# Patient Record
Sex: MALE | Race: WHITE | Employment: FULL TIME | URBAN - METROPOLITAN AREA
[De-identification: names, ages, dates, MRNs, and addresses within clinical notes are randomized per-mention and may not be internally consistent; named-entity substitution may affect disease eponyms.]

---

## 2022-08-18 ENCOUNTER — APPOINTMENT (OUTPATIENT)
Dept: GENERAL RADIOLOGY | Age: 42
End: 2022-08-18
Payer: COMMERCIAL

## 2022-08-18 ENCOUNTER — HOSPITAL ENCOUNTER (EMERGENCY)
Age: 42
Discharge: HOME | End: 2022-08-18
Attending: EMERGENCY MEDICINE
Payer: COMMERCIAL

## 2022-08-18 VITALS
HEART RATE: 86 BPM | HEIGHT: 77 IN | WEIGHT: 256.84 LBS | TEMPERATURE: 99 F | OXYGEN SATURATION: 97 % | DIASTOLIC BLOOD PRESSURE: 92 MMHG | SYSTOLIC BLOOD PRESSURE: 154 MMHG | RESPIRATION RATE: 16 BRPM | BODY MASS INDEX: 30.33 KG/M2

## 2022-08-18 DIAGNOSIS — S50.12XA CONTUSION OF LEFT FOREARM, INITIAL ENCOUNTER: Primary | ICD-10-CM

## 2022-08-18 PROCEDURE — 73090 X-RAY EXAM OF FOREARM: CPT

## 2022-08-18 PROCEDURE — 6370000000 HC RX 637 (ALT 250 FOR IP): Performed by: EMERGENCY MEDICINE

## 2022-08-18 PROCEDURE — 99283 EMERGENCY DEPT VISIT LOW MDM: CPT

## 2022-08-18 RX ORDER — IBUPROFEN 600 MG/1
600 TABLET ORAL ONCE
Status: COMPLETED | OUTPATIENT
Start: 2022-08-18 | End: 2022-08-18

## 2022-08-18 RX ADMIN — IBUPROFEN 600 MG: 600 TABLET ORAL at 13:36

## 2022-08-18 ASSESSMENT — PAIN - FUNCTIONAL ASSESSMENT: PAIN_FUNCTIONAL_ASSESSMENT: 0-10

## 2022-08-18 ASSESSMENT — PAIN SCALES - GENERAL
PAINLEVEL_OUTOF10: 4
PAINLEVEL_OUTOF10: 3
PAINLEVEL_OUTOF10: 3

## 2022-08-18 ASSESSMENT — PAIN DESCRIPTION - DESCRIPTORS: DESCRIPTORS: ACHING

## 2022-08-18 ASSESSMENT — PAIN DESCRIPTION - LOCATION: LOCATION: ARM

## 2022-08-18 ASSESSMENT — PAIN DESCRIPTION - ORIENTATION: ORIENTATION: LEFT

## 2022-08-18 NOTE — ED PROVIDER NOTES
[address] ENCOUNTER      Pt Name: Gildardo Lara  MRN: [id number]  [place] 1980  Date of evaluation: 8/18/2022  Provider: Marianna Cruz MD    CHIEF COMPLAINT       Chief Complaint   Patient presents with    Arm Injury     Pt. Had a couch fall on left forearm at work at 1230, swelling noted left forearm, ice applied         HISTORY OF PRESENT ILLNESS   (Location/Symptom, Timing/Onset, Context/Setting, Quality, Duration, Modifying Factors, Severity)  Note limiting factors. Gildardo Lara is a 40 y.o. male with past medical history of no significant illness here today with a left forearm injury. Patient states he was at work and just prior to arrival a couch fell and landed directly on his forearm. He is complaining with throbbing aching pain in the left forearm worse with any attempted range of motion or touching the site. He states he has noticed a large bruise at the area. He does not take blood thinners. No numbness, tingling or weakness in his hand. HPI    Nursing Notes were reviewed. REVIEW OF SYSTEMS    (2-9 systems for level 4, 10 or more for level 5)     Review of Systems    Please see HPI for pertinent positive and negative review of system findings. A full 10 system ROS was performed and otherwise negative. PAST MEDICAL HISTORY   History reviewed. No pertinent past medical history. SURGICAL HISTORY       Past Surgical History:   Procedure Laterality Date    HERNIA REPAIR Left 05/01/2010    TYMPANOSTOMY TUBE PLACEMENT Bilateral          CURRENT MEDICATIONS       Previous Medications    No medications on file       ALLERGIES     Codeine and Motrin [ibuprofen micronized]    FAMILY HISTORY     History reviewed. No pertinent family history.        SOCIAL HISTORY       Social History     Socioeconomic History    Marital status: Single     Spouse name: None    Number of children: None    Years of education: None    Highest education level: None   Tobacco Use    Smoking status: Every Day     Packs/day: 0.50     Types: Cigarettes    Smokeless tobacco: Never   Vaping Use    Vaping Use: Never used   Substance and Sexual Activity    Alcohol use: Yes     Comment: weekends    Drug use: Never       SCREENINGS    Sagrario Coma Scale  Eye Opening: Spontaneous  Best Verbal Response: Oriented  Best Motor Response: Obeys commands  Sagrario Coma Scale Score: 15          PHYSICAL EXAM    (up to 7 for level 4, 8 or more for level 5)     ED Triage Vitals   BP Temp Temp src Pulse Resp SpO2 Height Weight   -- -- -- -- -- -- -- --     Vitals:    08/18/22 1323   BP: (!) 158/118   Pulse: 98   Resp: 16   Temp: 99 °F (37.2 °C)   SpO2: 97%         Physical Exam      General appearance:  Cooperative. No acute distress. Skin:  Warm. Dry. Ears, nose, mouth and throat:  Oral mucosa moist,  Perfusion:  intact with 2+ left radial pulse  Respiratory: Respirations nonlabored. Neurological:  Alert. Moves all extremities spontaneously. Intact sensation throughout all digits of the left hand  Musculoskeletal:   Palpable hematoma over the distal third of the radius with no gross defect. Tenderness to palpation at this site. Overall compartments of the forearm are soft. Patient can flex and extend the left wrist.  Normal flexion extension of all fingers in the left hand. Psychiatric:  Normal mood      DIAGNOSTIC RESULTS       Labs Reviewed - No data to display    Interpretation per the Radiologist below, if obtained/available at the time of this note:    XR RADIUS ULNA LEFT (2 VIEWS)   Final Result   Soft tissue swelling without osseous abnormality             All other labs/imaging were within normal range or not returned as of this dictation.     EMERGENCY DEPARTMENT COURSE and DIFFERENTIAL DIAGNOSIS/MDM:   Vitals:    Vitals:    08/18/22 1323   BP: (!) 158/118   Pulse: 98   Resp: 16   Temp: 99 °F (37.2 °C)   TempSrc: Oral   SpO2: 97%   Weight: 256 lb 13.4 oz (116.5 kg)   Height: 6' 5\" (1.956 m)       Patient presented to the emergency department today after having a couch laying on his left forearm. Exam consistent with small hematoma and contusion but no gross deformity. X-rays show no evidence of fracture. Neurovascularly intact distally. Placed in an Ace wrap. Recommend RICE. May follow-up as an outpatient with occupational health as needed    Tana Sevilla M.D., nathan the primary clinician of record. MDM      CONSULTS     None    Critical Care:   None    REASSESSMENT          PROCEDURE     Unless otherwise noted below, none     Procedures      FINAL IMPRESSION      1. Contusion of left forearm, initial encounter            DISPOSITION/PLAN   DISPOSITION Decision To Discharge 08/18/2022 02:09:11 PM        PATIENT REFERRED TO:  [address] [facility] [address]  [phone]  Schedule an appointment as soon as possible for a visit       DISCHARGE MEDICATIONS:  New Prescriptions    No medications on file     Controlled Substances Monitoring:     No flowsheet data found.     (Please note that portions of this note were completed with a voice recognition program.  Efforts were made to edit the dictations but occasionally words are mis-transcribed.)    Derrick Aguero MD (electronically signed)  Attending Emergency Physician            Gray Osorio MD  08/18/22 7556

## 2022-08-18 NOTE — Clinical Note
Tayler Marquez was seen and treated in our emergency department on 8/18/2022. He may return to work on 08/19/2022. If you have any questions or concerns, please don't hesitate to call.       Margie Sebastian MD

## 2023-06-28 ENCOUNTER — HOSPITAL ENCOUNTER (EMERGENCY)
Age: 43
Discharge: HOME | End: 2023-06-28
Attending: EMERGENCY MEDICINE
Payer: MEDICAID

## 2023-06-28 ENCOUNTER — APPOINTMENT (OUTPATIENT)
Dept: CT IMAGING | Age: 43
End: 2023-06-28
Payer: MEDICAID

## 2023-06-28 VITALS
SYSTOLIC BLOOD PRESSURE: 144 MMHG | BODY MASS INDEX: 31.63 KG/M2 | RESPIRATION RATE: 16 BRPM | TEMPERATURE: 98 F | OXYGEN SATURATION: 95 % | DIASTOLIC BLOOD PRESSURE: 99 MMHG | WEIGHT: 267.86 LBS | HEART RATE: 79 BPM | HEIGHT: 77 IN

## 2023-06-28 DIAGNOSIS — S00.83XA CONTUSION OF JAW, INITIAL ENCOUNTER: Primary | ICD-10-CM

## 2023-06-28 PROCEDURE — 99284 EMERGENCY DEPT VISIT MOD MDM: CPT

## 2023-06-28 PROCEDURE — 70486 CT MAXILLOFACIAL W/O DYE: CPT

## 2023-06-28 ASSESSMENT — PAIN SCALES - GENERAL
PAINLEVEL_OUTOF10: 4
PAINLEVEL_OUTOF10: 2
PAINLEVEL_OUTOF10: 0

## 2023-06-28 ASSESSMENT — PAIN DESCRIPTION - PAIN TYPE: TYPE: ACUTE PAIN

## 2023-06-28 ASSESSMENT — PAIN - FUNCTIONAL ASSESSMENT
PAIN_FUNCTIONAL_ASSESSMENT: 0-10
PAIN_FUNCTIONAL_ASSESSMENT: 0-10

## 2023-06-28 ASSESSMENT — PAIN DESCRIPTION - DESCRIPTORS: DESCRIPTORS: ACHING

## 2023-06-28 ASSESSMENT — PAIN DESCRIPTION - ORIENTATION: ORIENTATION: RIGHT

## 2023-06-28 ASSESSMENT — PAIN DESCRIPTION - FREQUENCY: FREQUENCY: CONTINUOUS

## 2023-06-28 ASSESSMENT — PAIN DESCRIPTION - LOCATION: LOCATION: JAW

## 2024-12-23 ENCOUNTER — APPOINTMENT (OUTPATIENT)
Dept: GENERAL RADIOLOGY | Age: 44
End: 2024-12-23
Payer: OTHER GOVERNMENT

## 2024-12-23 ENCOUNTER — APPOINTMENT (OUTPATIENT)
Dept: CT IMAGING | Age: 44
End: 2024-12-23
Payer: OTHER GOVERNMENT

## 2024-12-23 ENCOUNTER — HOSPITAL ENCOUNTER (EMERGENCY)
Age: 44
Discharge: HOME | End: 2024-12-23
Attending: EMERGENCY MEDICINE
Payer: OTHER GOVERNMENT

## 2024-12-23 VITALS
OXYGEN SATURATION: 96 % | WEIGHT: 262.13 LBS | DIASTOLIC BLOOD PRESSURE: 98 MMHG | HEIGHT: 76 IN | BODY MASS INDEX: 31.92 KG/M2 | RESPIRATION RATE: 17 BRPM | TEMPERATURE: 98.2 F | SYSTOLIC BLOOD PRESSURE: 148 MMHG | HEART RATE: 93 BPM

## 2024-12-23 DIAGNOSIS — E83.42 HYPOMAGNESEMIA: Primary | ICD-10-CM

## 2024-12-23 DIAGNOSIS — K70.9 ALCOHOLIC LIVER DISEASE (HCC): ICD-10-CM

## 2024-12-23 DIAGNOSIS — F10.20 ALCOHOLISM (HCC): ICD-10-CM

## 2024-12-23 DIAGNOSIS — S09.90XA INJURY OF HEAD, INITIAL ENCOUNTER: ICD-10-CM

## 2024-12-23 DIAGNOSIS — S16.1XXA ACUTE STRAIN OF NECK MUSCLE, INITIAL ENCOUNTER: ICD-10-CM

## 2024-12-23 LAB
ALBUMIN SERPL-MCNC: 3.6 G/DL (ref 3.4–5)
ALBUMIN/GLOB SERPL: 0.8 {RATIO} (ref 1.1–2.2)
ALP SERPL-CCNC: 161 U/L (ref 40–129)
ALT SERPL-CCNC: 151 U/L (ref 10–40)
ANION GAP SERPL CALCULATED.3IONS-SCNC: 12 MMOL/L (ref 3–16)
AST SERPL-CCNC: 185 U/L (ref 15–37)
BASOPHILS # BLD: 0 K/UL (ref 0–0.2)
BASOPHILS NFR BLD: 0.5 %
BILIRUB SERPL-MCNC: 2.6 MG/DL (ref 0–1)
BUN SERPL-MCNC: 8 MG/DL (ref 7–20)
CALCIUM SERPL-MCNC: 9.2 MG/DL (ref 8.3–10.6)
CHLORIDE SERPL-SCNC: 104 MMOL/L (ref 99–110)
CO2 SERPL-SCNC: 24 MMOL/L (ref 21–32)
CREAT SERPL-MCNC: 0.8 MG/DL (ref 0.9–1.3)
DEPRECATED RDW RBC AUTO: 11.9 % (ref 12.4–15.4)
EOSINOPHIL # BLD: 0.1 K/UL (ref 0–0.6)
EOSINOPHIL NFR BLD: 1.5 %
GFR SERPLBLD CREATININE-BSD FMLA CKD-EPI: >90 ML/MIN/{1.73_M2}
GLUCOSE SERPL-MCNC: 144 MG/DL (ref 70–99)
HCT VFR BLD AUTO: 49.8 % (ref 40.5–52.5)
HGB BLD-MCNC: 16.8 G/DL (ref 13.5–17.5)
IMM GRANULOCYTES # BLD: 0 K/UL (ref 0–0.2)
IMM GRANULOCYTES NFR BLD: 0.2 %
LIPASE SERPL-CCNC: 53 U/L (ref 13–60)
LYMPHOCYTES # BLD: 1.3 K/UL (ref 1–5.1)
LYMPHOCYTES NFR BLD: 32 %
MAGNESIUM SERPL-MCNC: 1.1 MG/DL (ref 1.8–2.4)
MCH RBC QN AUTO: 31.8 PG (ref 26–34)
MCHC RBC AUTO-ENTMCNC: 33.7 G/DL (ref 32–36.4)
MCV RBC AUTO: 94.3 FL (ref 80–100)
MONOCYTES # BLD: 0.3 K/UL (ref 0–1.3)
MONOCYTES NFR BLD: 7.4 %
NEUTROPHILS # BLD: 2.4 K/UL (ref 1.7–7.7)
NEUTROPHILS NFR BLD: 58.4 %
PLATELET # BLD AUTO: 49 K/UL (ref 135–450)
PLATELET BLD QL SMEAR: ABNORMAL
PMV BLD AUTO: 13.5 FL (ref 5–10.5)
POTASSIUM SERPL-SCNC: 3.5 MMOL/L (ref 3.5–5.1)
PROT SERPL-MCNC: 8.1 G/DL (ref 6.4–8.2)
RBC # BLD AUTO: 5.28 M/UL (ref 4.2–5.9)
SLIDE REVIEW: ABNORMAL
SODIUM SERPL-SCNC: 140 MMOL/L (ref 136–145)
TROPONIN, HIGH SENSITIVITY: 7 NG/L (ref 0–22)
WBC # BLD AUTO: 4 K/UL (ref 4–11)

## 2024-12-23 PROCEDURE — 36415 COLL VENOUS BLD VENIPUNCTURE: CPT

## 2024-12-23 PROCEDURE — 70450 CT HEAD/BRAIN W/O DYE: CPT

## 2024-12-23 PROCEDURE — 6360000002 HC RX W HCPCS: Performed by: EMERGENCY MEDICINE

## 2024-12-23 PROCEDURE — 96376 TX/PRO/DX INJ SAME DRUG ADON: CPT

## 2024-12-23 PROCEDURE — 96365 THER/PROPH/DIAG IV INF INIT: CPT

## 2024-12-23 PROCEDURE — 71045 X-RAY EXAM CHEST 1 VIEW: CPT

## 2024-12-23 PROCEDURE — 99285 EMERGENCY DEPT VISIT HI MDM: CPT

## 2024-12-23 PROCEDURE — 84484 ASSAY OF TROPONIN QUANT: CPT

## 2024-12-23 PROCEDURE — 83735 ASSAY OF MAGNESIUM: CPT

## 2024-12-23 PROCEDURE — 83690 ASSAY OF LIPASE: CPT

## 2024-12-23 PROCEDURE — 2580000003 HC RX 258: Performed by: EMERGENCY MEDICINE

## 2024-12-23 PROCEDURE — 80053 COMPREHEN METABOLIC PANEL: CPT

## 2024-12-23 PROCEDURE — 85025 COMPLETE CBC W/AUTO DIFF WBC: CPT

## 2024-12-23 PROCEDURE — 96375 TX/PRO/DX INJ NEW DRUG ADDON: CPT

## 2024-12-23 PROCEDURE — 93005 ELECTROCARDIOGRAM TRACING: CPT | Performed by: EMERGENCY MEDICINE

## 2024-12-23 PROCEDURE — 72125 CT NECK SPINE W/O DYE: CPT

## 2024-12-23 PROCEDURE — 96366 THER/PROPH/DIAG IV INF ADDON: CPT

## 2024-12-23 RX ORDER — ONDANSETRON 4 MG/1
4 TABLET, ORALLY DISINTEGRATING ORAL 3 TIMES DAILY PRN
Qty: 21 TABLET | Refills: 0 | Status: SHIPPED | OUTPATIENT
Start: 2024-12-23

## 2024-12-23 RX ORDER — LORAZEPAM 2 MG/ML
1 INJECTION INTRAMUSCULAR ONCE
Status: COMPLETED | OUTPATIENT
Start: 2024-12-23 | End: 2024-12-23

## 2024-12-23 RX ORDER — ONDANSETRON 4 MG/1
4 TABLET, ORALLY DISINTEGRATING ORAL 3 TIMES DAILY PRN
Qty: 21 TABLET | Refills: 0 | Status: SHIPPED | OUTPATIENT
Start: 2024-12-23 | End: 2024-12-23

## 2024-12-23 RX ORDER — 0.9 % SODIUM CHLORIDE 0.9 %
1000 INTRAVENOUS SOLUTION INTRAVENOUS ONCE
Status: COMPLETED | OUTPATIENT
Start: 2024-12-23 | End: 2024-12-23

## 2024-12-23 RX ORDER — ONDANSETRON 2 MG/ML
4 INJECTION INTRAMUSCULAR; INTRAVENOUS ONCE
Status: COMPLETED | OUTPATIENT
Start: 2024-12-23 | End: 2024-12-23

## 2024-12-23 RX ORDER — MAGNESIUM SULFATE IN WATER 40 MG/ML
2000 INJECTION, SOLUTION INTRAVENOUS ONCE
Status: COMPLETED | OUTPATIENT
Start: 2024-12-23 | End: 2024-12-23

## 2024-12-23 RX ADMIN — SODIUM CHLORIDE 1000 ML: 0.9 INJECTION, SOLUTION INTRAVENOUS at 18:23

## 2024-12-23 RX ADMIN — LORAZEPAM 1 MG: 2 INJECTION INTRAMUSCULAR; INTRAVENOUS at 19:51

## 2024-12-23 RX ADMIN — ONDANSETRON 4 MG: 2 INJECTION INTRAMUSCULAR; INTRAVENOUS at 19:49

## 2024-12-23 RX ADMIN — MAGNESIUM SULFATE HEPTAHYDRATE 2000 MG: 2 INJECTION, SOLUTION INTRAVENOUS at 18:52

## 2024-12-23 RX ADMIN — LORAZEPAM 1 MG: 2 INJECTION INTRAMUSCULAR; INTRAVENOUS at 17:59

## 2024-12-23 RX ADMIN — ONDANSETRON 4 MG: 2 INJECTION INTRAMUSCULAR; INTRAVENOUS at 18:00

## 2024-12-23 ASSESSMENT — PAIN DESCRIPTION - DESCRIPTORS
DESCRIPTORS: ACHING;DISCOMFORT
DESCRIPTORS: ACHING

## 2024-12-23 ASSESSMENT — PAIN DESCRIPTION - LOCATION
LOCATION: HEAD
LOCATION: HEAD;NECK

## 2024-12-23 ASSESSMENT — PAIN - FUNCTIONAL ASSESSMENT
PAIN_FUNCTIONAL_ASSESSMENT: 0-10
PAIN_FUNCTIONAL_ASSESSMENT: PREVENTS OR INTERFERES SOME ACTIVE ACTIVITIES AND ADLS

## 2024-12-23 ASSESSMENT — LIFESTYLE VARIABLES
HOW MANY STANDARD DRINKS CONTAINING ALCOHOL DO YOU HAVE ON A TYPICAL DAY: 3 OR 4
HOW OFTEN DO YOU HAVE A DRINK CONTAINING ALCOHOL: 2-3 TIMES A WEEK

## 2024-12-23 ASSESSMENT — PAIN DESCRIPTION - FREQUENCY: FREQUENCY: CONTINUOUS

## 2024-12-23 ASSESSMENT — PAIN DESCRIPTION - PAIN TYPE: TYPE: ACUTE PAIN

## 2024-12-23 ASSESSMENT — PAIN DESCRIPTION - ORIENTATION: ORIENTATION: ANTERIOR;MID;POSTERIOR

## 2024-12-23 ASSESSMENT — PAIN SCALES - GENERAL
PAINLEVEL_OUTOF10: 3
PAINLEVEL_OUTOF10: 6
PAINLEVEL_OUTOF10: 3

## 2024-12-23 NOTE — ED NOTES
Pt requesting some more nausea medication and something for a sharp neck pain that he is now having in the base of his neck. EDMD made aware and states he will order something. RN awaiting orders

## 2024-12-23 NOTE — ED PROVIDER NOTES
EMERGENCY DEPARTMENT ENCOUNTER     [facility]     Pt Name: Amor Valadez   MRN: [id number]   Birthdate 1980   Date of evaluation: 12/23/2024   Provider: WALESKA COOK MD   PCP: No primary care provider on file.   Note Started: 5:57 PM EST 12/23/24     CHIEF COMPLAINT     Chief Complaint   Patient presents with    Head Injury     Occurred while walking to the restroom this morning around 6881-3633, reporting he slipped while walking and landed on the left side of his head. Reports only laid there for a minute or two;  reports around 8905-4207 started having N/V w/ a severe headache; also reports having random seizure HX (last one 2022) w/o medications. Also had a few alcohol beverages yesterday afternoon         HISTORY OF PRESENT ILLNESS:  History from : Patient   Limitations to history : None     Amor Valadez is a 44 y.o. male who presents with headache and vomiting.  The patient states that about 2:58 in the morning he was walking to use the restroom slipped and fell hitting the left side of his head.  He thinks he briefly lost consciousness after hitting his head, but was able to get himself back to bed.  He has had a headache ever since then and started vomiting around 1 PM today having vomited several times.  The patient had had about 6 double bourbons and Coke that evening.  He describes himself as an alcoholic and that is not a lot of alcohol at all and his wife said he was not intoxicated last night.  He has had a seizure back in 2022 associated with heavy alcohol use.  The patient has no abdominal pain or back pain.  He does have neck pain.  He has no numbness or tingling or weakness in the arms of the legs.    Nursing Notes were all reviewed and agreed with or any disagreements were addressed in the HPI.     ROS: Positives and Pertinent negatives as per HPI.    PAST MEDICAL HISTORY     Past medical history:  has no past medical history on

## 2024-12-23 NOTE — ED TRIAGE NOTES
Patient arrived to the ED ambulatory from home w/ complaints of head injury.     Patient/EMS reports states Occurred while walking to the restroom this morning around 7919-0747, reporting he slipped while walking and landed on the left side of his head. Reports only laid there for a minute or two;  reports around 8655-1013 started having N/V w/ a severe headache; also reports having random seizure HX (last one 2022) w/o medications. Also had a few alcohol beverages yesterday afternoon.    Pt arrives to the ED diaphoretic and dry heaving; reporting last drink was around 4196-7955 and he is normally a heavy drinker and wife reports that his drinking wasn't nearly as much as he normally does.      Patient A&O x 4,

## 2024-12-24 LAB
EKG ATRIAL RATE: 118 BPM
EKG DIAGNOSIS: NORMAL
EKG P AXIS: 62 DEGREES
EKG P-R INTERVAL: 142 MS
EKG Q-T INTERVAL: 328 MS
EKG QRS DURATION: 80 MS
EKG QTC CALCULATION (BAZETT): 459 MS
EKG R AXIS: 27 DEGREES
EKG T AXIS: 35 DEGREES
EKG VENTRICULAR RATE: 118 BPM

## 2024-12-24 PROCEDURE — 93010 ELECTROCARDIOGRAM REPORT: CPT | Performed by: INTERNAL MEDICINE

## 2024-12-24 NOTE — ED NOTES
Patient c/o nausea, reports pain base of neck, also HA middle of forehead, rates as 6/10.  CIWA scale complete.  See flowsheet.      When asked, patient reports he normally drinks 600 mL of bourbon daily.  He states yesterday he only drank 375 mL, last drink was 12/22/24 @ 1800.    Dr. Dudley updated.

## 2024-12-24 NOTE — ED NOTES
Patient requesting water while waiting for IV mag to finish.  Water provided.  Patient denies further needs.  Will continue to monitor.

## 2024-12-24 NOTE — ED NOTES
Discharge instructions reviewed with patient and wife.  Patient's wife requests pharmacy to be changed to [facility], asks if patient can wear a scopolamine patch when they get home and patient requests work note.  Dr. Dudley updated.  Work note provided, instructed patient to not use scopolamine patch, pharmacy updated.  Patient and wife verbalized understanding.  Patient's IV is removed, catheter intact, pressure dressing applied, no noted bleeding from site.  Patient ambulates from ED, gait is steady, all belongings in hand, in no apparent distress at this time.

## 2024-12-24 NOTE — ED PROVIDER NOTES
[place] of Care Note:    I assumed care of this patient at 1900 from Dr. Yadav, please see Leif note for more detail. Briefly, this pt is a 44-year-old alcoholic who reportedly fell hitting the back of his head last night and has had headache, neck pain and vomiting today.  Patient reports no diarrhea or abdominal pain.  No pale stools.  Patient is a heavy daily drinker.  No neurologic deficits, visual changes, epistaxis.  No chest pain or shortness of breath.    Laboratory findings: Patient does have hypomagnesemia with a magnesium level of 1.1 as well as elevated alk phos, total bilirubin and transaminases but reports no abdominal pain or diarrhea.    Medical decision making: Head injury with vomiting and nausea most likely represent patient with mild concussion.  Patient did report some neck discomfort but CAT scan of the head reveals no evidence of skull fracture or intracranial injury, intracranial bleeding and cervical spine films revealed no evidence of acute fracture or dislocation.  I am hesitant to use anti-inflammatories or Tylenol this patient with known alcoholic liver disease, chronic alcoholism and also muscle relaxants and the patient that may have issues with sedation in combination with alcohol.    Nausea: Patient initially was tachycardic and hypertensive but this was likely secondary to probably accommodation of mild alcohol withdrawal, nausea and pain.  Patient's tachycardia resolved with Ativan and I did offer the patient admission for alcohol withdrawal and possibly alcoholic hepatitis which he declined.  Patient did have replacement of his hypomagnesemia with IV magnesium in the emergency department.  Patient was counseled to decrease or discontinue his alcohol intake and patient does have laboratory evidence of alcoholic hepatitis with an AST ALT ratio greater than 1 and mild elevation of his alk phos and bilirubin with no abdominal tenderness or right upper quadrant tenderness and no

## 2024-12-24 NOTE — ED NOTES
Handoff report given to TERRANCE Seaman; all questions and concerns answered; receiving Rn acknowledged and had no further questions at this time; receiving RN to assume all care at this time.

## 2025-02-07 ENCOUNTER — HOSPITAL ENCOUNTER (EMERGENCY)
Age: 45
Discharge: HOME | End: 2025-02-07
Attending: STUDENT IN AN ORGANIZED HEALTH CARE EDUCATION/TRAINING PROGRAM
Payer: OTHER GOVERNMENT

## 2025-02-07 VITALS
HEIGHT: 77 IN | TEMPERATURE: 98.2 F | WEIGHT: 262.35 LBS | DIASTOLIC BLOOD PRESSURE: 98 MMHG | HEART RATE: 92 BPM | OXYGEN SATURATION: 93 % | SYSTOLIC BLOOD PRESSURE: 153 MMHG | BODY MASS INDEX: 30.98 KG/M2 | RESPIRATION RATE: 19 BRPM

## 2025-02-07 DIAGNOSIS — B34.9 VIRAL SYNDROME: ICD-10-CM

## 2025-02-07 DIAGNOSIS — H61.21 IMPACTED CERUMEN OF RIGHT EAR: Primary | ICD-10-CM

## 2025-02-07 PROCEDURE — 99283 EMERGENCY DEPT VISIT LOW MDM: CPT

## 2025-02-07 PROCEDURE — 6370000000 HC RX 637 (ALT 250 FOR IP): Performed by: STUDENT IN AN ORGANIZED HEALTH CARE EDUCATION/TRAINING PROGRAM

## 2025-02-07 RX ORDER — IBUPROFEN 600 MG/1
600 TABLET, FILM COATED ORAL ONCE
Status: COMPLETED | OUTPATIENT
Start: 2025-02-07 | End: 2025-02-07

## 2025-02-07 RX ADMIN — IBUPROFEN 600 MG: 600 TABLET, FILM COATED ORAL at 14:32

## 2025-02-07 RX ADMIN — DOCUSATE SODIUM 100 MG: 50 LIQUID ORAL at 12:56

## 2025-02-07 ASSESSMENT — PAIN - FUNCTIONAL ASSESSMENT
PAIN_FUNCTIONAL_ASSESSMENT: 0-10
PAIN_FUNCTIONAL_ASSESSMENT: PREVENTS OR INTERFERES SOME ACTIVE ACTIVITIES AND ADLS
PAIN_FUNCTIONAL_ASSESSMENT: 0-10
PAIN_FUNCTIONAL_ASSESSMENT: PREVENTS OR INTERFERES SOME ACTIVE ACTIVITIES AND ADLS

## 2025-02-07 ASSESSMENT — PAIN DESCRIPTION - LOCATION
LOCATION: EAR

## 2025-02-07 ASSESSMENT — PAIN DESCRIPTION - DESCRIPTORS
DESCRIPTORS: ACHING
DESCRIPTORS: ACHING
DESCRIPTORS: ACHING;POUNDING

## 2025-02-07 ASSESSMENT — PAIN DESCRIPTION - ORIENTATION
ORIENTATION: RIGHT

## 2025-02-07 ASSESSMENT — PAIN DESCRIPTION - FREQUENCY: FREQUENCY: CONTINUOUS

## 2025-02-07 ASSESSMENT — PAIN SCALES - GENERAL
PAINLEVEL_OUTOF10: 7
PAINLEVEL_OUTOF10: 7
PAINLEVEL_OUTOF10: 3

## 2025-02-07 ASSESSMENT — PAIN DESCRIPTION - PAIN TYPE
TYPE: ACUTE PAIN
TYPE: ACUTE PAIN

## 2025-02-07 ASSESSMENT — LIFESTYLE VARIABLES: HOW OFTEN DO YOU HAVE A DRINK CONTAINING ALCOHOL: NEVER

## 2025-02-07 NOTE — DISCHARGE INSTRUCTIONS
You were seen today in the emergency department due to congestion and feeling of fullness in your ear.  Your exam was reassuring and is likely that your symptoms are secondary to a virus.  You may use Zyrtec as well as Mucinex over-the-counter to help with your congestion.  Please follow-up with your regular doctor for symptoms do not improve over the next 24 to 48 hours.  Please turn to the emergency department if you experience any further concerning symptoms.

## 2025-02-07 NOTE — ED TRIAGE NOTES
Has had a cough and nasal congestion for 3 to 4 days.  Has not yet tried any OTC cough and cold medications.  No fever, vomiting or diarrhea.   Also developed right ear pain this morning.  States had a small amount of drainage from the ear.

## 2025-02-07 NOTE — ED PROVIDER NOTES
[facility] EMERGENCY DEPARTMENT      EMERGENCY MEDICINE     Pt Name: Amor Valadez  MRN: [id number]  Birthdate 1980  Date of evaluation: 2/7/2025  Provider: Parveen Swain DO    CHIEF COMPLAINT       Chief Complaint   Patient presents with    Cough     Has had a cough and nasal congestion for 3 to 4 days.  Has not yet tried any OTC cough and cold medications.  No fever, vomiting or diarrhea.     Ear Pain     Awoke this morning with right ear pain.  States had a little drainage from the ear.     HISTORY OF PRESENT ILLNESS   Amor Valadez is a 45 y.o. male who presents to the emergency department for congestion and cough for the last 3 days.  Patient states his son has had similar symptoms.  Today noted that his right ear was having some pain and he felt like he was having muffled hearing out of it.  Although he indicated to triage nurse he was having drainage out of the ear, when I questioned him he stated he was not sure but he thinks there may have been.  There is not any notable drainage on exam.  He has not been having any chest pain abdominal pain nausea vomiting or shortness of breath.  No objective fevers at home.        PASTMEDICAL HISTORY   History reviewed. No pertinent past medical history.    There is no problem list on file for this patient.    SURGICAL HISTORY       Past Surgical History:   Procedure Laterality Date    HERNIA REPAIR Left 05/01/2010    TYMPANOSTOMY TUBE PLACEMENT Bilateral     VASECTOMY         CURRENT MEDICATIONS       Discharge Medication List as of 2/7/2025  3:06 PM          ALLERGIES     is allergic to codeine and motrin [ibuprofen micronized].    FAMILY HISTORY     has no family status information on file.        SOCIAL HISTORY       Social History     Tobacco Use    Smoking status: Every Day     Current packs/day: 0.50     Types: Cigarettes    Smokeless tobacco: Never   Vaping Use    Vaping status: Never Used   Substance Use Topics    Alcohol use: Not